# Patient Record
Sex: FEMALE | URBAN - METROPOLITAN AREA
[De-identification: names, ages, dates, MRNs, and addresses within clinical notes are randomized per-mention and may not be internally consistent; named-entity substitution may affect disease eponyms.]

---

## 2017-05-04 ENCOUNTER — TRANSFERRED RECORDS (OUTPATIENT)
Dept: HEALTH INFORMATION MANAGEMENT | Facility: CLINIC | Age: 67
End: 2017-05-04

## 2017-09-05 ENCOUNTER — TELEPHONE (OUTPATIENT)
Dept: RHEUMATOLOGY | Facility: CLINIC | Age: 67
End: 2017-09-05

## 2017-10-23 ENCOUNTER — RECORDS - HEALTHEAST (OUTPATIENT)
Dept: LAB | Facility: CLINIC | Age: 67
End: 2017-10-23

## 2017-10-23 LAB
CHOLEST SERPL-MCNC: 233 MG/DL
FASTING STATUS PATIENT QL REPORTED: NO
HDLC SERPL-MCNC: 67 MG/DL
LDLC SERPL CALC-MCNC: 145 MG/DL
TRIGL SERPL-MCNC: 104 MG/DL

## 2017-11-30 ENCOUNTER — OFFICE VISIT (OUTPATIENT)
Dept: DERMATOLOGY | Facility: CLINIC | Age: 67
End: 2017-11-30

## 2017-11-30 DIAGNOSIS — L93.0 DISCOID LUPUS ERYTHEMATOSUS: Primary | ICD-10-CM

## 2017-11-30 RX ORDER — DESONIDE 0.5 MG/G
CREAM TOPICAL
COMMUNITY
Start: 2017-06-01

## 2017-11-30 RX ORDER — TACROLIMUS 1 MG/G
OINTMENT TOPICAL 2 TIMES DAILY
Qty: 60 G | Refills: 6 | Status: SHIPPED | OUTPATIENT
Start: 2017-11-30

## 2017-11-30 ASSESSMENT — PAIN SCALES - GENERAL: PAINLEVEL: NO PAIN (0)

## 2017-11-30 NOTE — LETTER
11/30/2017       RE: Cintia Newby  [address] [place] [address]     Dear Colleague,    Thank you for referring your patient, Cintia Newby, to the [facility] DERMATOLOGY at [facility]. Please see a copy of my visit note below.    [facility] Dermatology Note    Dermatology Problem List:  1. Chronic cutaneous lupus (diagnosed 2008)   - Discoid lupus erythematosus    - Tumid lupus erythematosus   2. Positive TAMMY 1:160    Encounter Date: Nov 30, 2017    CC:  Chief Complaint   Patient presents with     Derm Problem     Cintia comes to clinic today for lesions on her face referred by rheumatology.     History of Present Illness:  Ms. Cintia Newby is a 67 year old female who presents as a new patient referred by Dr Rocio Samayoa, in Rheumatology for discoid lupus erythematosus.  In 2008 her first spots showed up  When she was living  in [place]. She notes at that time she was started on plaquanil and the lesions resolved, but recently the spots recurred. She was seen at [facility] and they re-biopsied the lesions which demonstrated similar pathology. She notes when she uses desonide ointment on the lesions, they resolve, but if she stops using the desonide on them they will recur. She notes that starting in 2017 that the lesions started looking a little different then they did in the past.  No dry eyes or mouth, no joint pain, no fatigue   No other lesions reported.     Past Medical History:   Discoid lupus erythematosus  Guillain-[place]   Hyperthyroidisim  Osteoarthritis    Social History:  The patient lived in [place] [facility] 15-20 years ago.     Family History:  The patient has a son with psoriatic arthritis.     Medications:  Current Outpatient Prescriptions   Medication Sig Dispense Refill     desonide (DESOWEN) 0.05 % cream        denosumab (PROLIA) 60 MG/ML SOLN injection        Allergies   Allergen Reactions      Macrodantin  [Nitrofurantoin Macrocrystal] Hives     Sulfamethoxazole-Trimethoprim Dermatitis     Review of Systems:  Otherwise nml state of health. No other skin concerns..    GEN: This is a well developed, well-nourished female in no acute distress, in a pleasant mood.    -Pink plaque with adherent scale with some hyperpigmentation not very well demarcated on the bridge on nose.   - Cheek: smooth pink indurated plaque without scale  -Gritty papule on the right temple   -Pink gritty plaque right temple 5x3 mm  -Nonspecific pink papule 2mm on check  -No other lesions of concern on areas examined.     Pathology   3/11/2008; Rt superior dorsum of nose  Thinning of the epidermis with hyperkeratosis. There is vacuolar change of the basal layer with thickening of the basement membrane. A superficial and deeper perivascular infiltrate of mononuclear cells is present with involvement of the preifollicular as well as perivascular areas. This correlates best with lupus erythematosus. Discoid lupus may have similar features.    5/8/17; left nasal root; rt cheek  The epidermis varies from atrophic to acanthotic with overlying hyperparakeratosis and underlying thickening of the basement membrane. There is pathcy basilar vacuolization which is sometimes associated with a lichenoid infiltrate. There is perivascular and periappendageal chronic inflammatory infiltrate present. This is consistent with a connective tissue disease (i.e. Lupus erythematosus)     Labs  + Ro60 20.7 (7/19), TAMMY neg (7/19/17), other CELE neg  + TAMMY (homogenous, 1:160; 10/19/16  C3/C4 wnl  ESR 30    Impression/Plan:  1. Chronic cutaneous lupus erythematosus    At this time does not meet criteria for systemic disease, but with increasing lesions, some possibly below the neck and + TAMMY and Ro60, will need to keep an eye on her to make sure she does not develop more symptoms    At this time she would prefer not to take systemic medications if it is not  needed and since topical medications keep the skin controlled, I think it is reasonable to just continue treating topically.    I would prefer she switch to protopic over desonide for more due to the fact it is stronger, less risk around the eyes long term and less risk of atrophy.     At this point, she can follow-up with me as needed. She should follow-up with me or rheumatology yearly.    Sun protection including regular sun screen use was advised.     Return to clinic as needed    Staff Involved:  Scribe/Staff    Scribe Disclosure:   I, Crystal Wright, am serving as a scribe to document services personally performed by Dr. Cristiano Paige, based on data collection and the provider's statements to me.       Cristiano Paige MD, FAAD    Departments of Internal Medicine and Dermatology  [facility]  [phone]

## 2017-11-30 NOTE — MR AVS SNAPSHOT
After Visit Summary   11/30/2017    Cintia Newby    MRN: [id number]           Patient Information     Date Of Birth          1950        Visit Information        Provider Department      11/30/2017 6:30 PM Cristiano Paige MD [facility] Dermatology        Today's Diagnoses     Discoid lupus erythematosus    -  1       Follow-ups after your visit        Who to contact     Please call your clinic at [phone] to:    Ask questions about your health    Make or cancel appointments    Discuss your medicines    Learn about your test results    Speak to your doctor   If you have compliments or concerns about an experience at your clinic, or if you wish to file a complaint, please contact [facility] Patient Relations at [phone] or email us at [email or web address]         Additional Information About Your Visit        MyChart Information     [employer] gives you secure access to your electronic health record. If you see a primary care provider, you can also send messages to your care team and make appointments. If you have questions, please call your primary care clinic.  If you do not have a primary care provider, please call [phone] and they will assist you.      [employer] is an electronic gateway that provides easy, online access to your medical records. With [employer], you can request a clinic appointment, read your test results, renew a prescription or communicate with your care team.     To access your existing account, please contact your [facility] or call [phone] for assistance.        Care EveryWhere ID     This is your Care EveryWhere ID. This could be used by other organizations to access your [place] medical records  [id number]         Blood Pressure from Last 3 Encounters:   No data found for BP    Weight from Last 3 Encounters:   No data found for Wt              Today, you had the following     No orders  found for display         Today's Medication Changes          These changes are accurate as of: 11/30/17 11:59 PM.  If you have any questions, ask your nurse or doctor.               Start taking these medicines.        Dose/Directions    tacrolimus 0.1 % ointment   Commonly known as:  PROTOPIC   Used for:  Discoid lupus erythematosus   Started by:  Cristiano Paige MD        Apply topically 2 times daily   Quantity:  60 g   Refills:  6            Where to get your medicines      These medications were sent to [facility]/pharmacy #[id number] [place], [place] - [address]  [address] [address]     Phone:  [phone]     tacrolimus 0.1 % ointment                Primary Care Provider    None Specified       No primary provider on file.        Equal Access to Services     [facility] : Stephon Richardson, dale brown, luzmaria hernandez, jackie polk. So [facility] [phone].    ATENCIÓN: Si habla español, tiene a chapman disposición servicios gratuitos de asistencia lingüística. Llame al [phone].    We comply with applicable federal civil rights laws and [place] laws. We do not discriminate on the basis of race, color, national origin, age, disability, sex, sexual orientation, or gender identity.            Thank you!     Thank you for choosing [facility]  for your care. Our goal is always to provide you with excellent care. Hearing back from our patients is one way we can continue to improve our services. Please take a few minutes to complete the written survey that you may receive in the mail after your visit with us. Thank you!             Your Updated Medication List - Protect others around you: Learn how to safely use, store and throw away your medicines at www.disposemymeds.org.          This list is accurate as of: 11/30/17 11:59 PM.  Always use your most recent med list.                   Brand Name Dispense Instructions for use  Diagnosis    desonide 0.05 % cream    DESOWEN          PROLIA 60 MG/ML Soln injection   Generic drug:  denosumab           tacrolimus 0.1 % ointment    PROTOPIC    60 g    Apply topically 2 times daily    Discoid lupus erythematosus

## 2017-12-01 NOTE — PROGRESS NOTES
[facility] Dermatology Note    Dermatology Problem List:  1. Chronic cutaneous lupus (diagnosed 2008)   - Discoid lupus erythematosus    - Tumid lupus erythematosus   2. Positive TAMMY 1:160    Encounter Date: Nov 30, 2017    CC:  Chief Complaint   Patient presents with     Derm Problem     Cintia comes to clinic today for lesions on her face referred by rheumatology.     History of Present Illness:  Ms. Cintia Newby is a 67 year old female who presents as a new patient referred by Dr Rocio Samayoa, in Rheumatology for discoid lupus erythematosus.  In 2008 her first spots showed up  When she was living  in [place]. She notes at that time she was started on plaquanil and the lesions resolved, but recently the spots recurred. She was seen at [facility] and they re-biopsied the lesions which demonstrated similar pathology. She notes when she uses desonide ointment on the lesions, they resolve, but if she stops using the desonide on them they will recur. She notes that starting in 2017 that the lesions started looking a little different then they did in the past.  No dry eyes or mouth, no joint pain, no fatigue   No other lesions reported.     Past Medical History:   Discoid lupus erythematosus  Guillain-[place]   Hyperthyroidisim  Osteoarthritis    Social History:  The patient lived in [facility] 15-20 years ago.     Family History:  The patient has a son with psoriatic arthritis.     Medications:  Current Outpatient Prescriptions   Medication Sig Dispense Refill     desonide (DESOWEN) 0.05 % cream        denosumab (PROLIA) 60 MG/ML SOLN injection        Allergies   Allergen Reactions     Macrodantin  [Nitrofurantoin Macrocrystal] Hives     Sulfamethoxazole-Trimethoprim Dermatitis     Review of Systems:  Otherwise nml state of health. No other skin concerns..    GEN: This is a well developed, well-nourished female in no acute distress, in a pleasant mood.    -Pink  plaque with adherent scale with some hyperpigmentation not very well demarcated on the bridge on nose.   - Cheek: smooth pink indurated plaque without scale  -Gritty papule on the right temple   -Pink gritty plaque right temple 5x3 mm  -Nonspecific pink papule 2mm on check  -No other lesions of concern on areas examined.     Pathology   3/11/2008; Rt superior dorsum of nose  Thinning of the epidermis with hyperkeratosis. There is vacuolar change of the basal layer with thickening of the basement membrane. A superficial and deeper perivascular infiltrate of mononuclear cells is present with involvement of the preifollicular as well as perivascular areas. This correlates best with lupus erythematosus. Discoid lupus may have similar features.    5/8/17; left nasal root; rt cheek  The epidermis varies from atrophic to acanthotic with overlying hyperparakeratosis and underlying thickening of the basement membrane. There is pathcy basilar vacuolization which is sometimes associated with a lichenoid infiltrate. There is perivascular and periappendageal chronic inflammatory infiltrate present. This is consistent with a connective tissue disease (i.e. Lupus erythematosus)     Labs  + Ro60 20.7 (7/19), TAMMY neg (7/19/17), other CELE neg  + TAMMY (homogenous, 1:160; 10/19/16  C3/C4 wnl  ESR 30    Impression/Plan:  1. Chronic cutaneous lupus erythematosus    At this time does not meet criteria for systemic disease, but with increasing lesions, some possibly below the neck and + TAMMY and Ro60, will need to keep an eye on her to make sure she does not develop more symptoms    At this time she would prefer not to take systemic medications if it is not needed and since topical medications keep the skin controlled, I think it is reasonable to just continue treating topically.    I would prefer she switch to protopic over desonide for more due to the fact it is stronger, less risk around the eyes long term and less risk of atrophy.     At  this point, she can follow-up with me as needed. She should follow-up with me or rheumatology yearly.    Sun protection including regular sun screen use was advised.     Return to clinic as needed    Staff Involved:  Scribe/Staff    Scribe Disclosure:   I, Crystal Wright, am serving as a scribe to document services personally performed by Dr. Cristiano Paige, based on data collection and the provider's statements to me.       Cristiano Paige MD, FAAD    Departments of Internal Medicine and Dermatology  [facility]  [phone]

## 2017-12-01 NOTE — NURSING NOTE
Dermatology Rooming Note    Cintia Newby's goals for this visit include:   Chief Complaint   Patient presents with     Derm Problem     Cintia comes to clinic today for lesions on her face referred by rheumatology.     Sivan Perry, CMA

## 2020-03-11 ENCOUNTER — HEALTH MAINTENANCE LETTER (OUTPATIENT)
Age: 70
End: 2020-03-11

## 2020-12-27 ENCOUNTER — HEALTH MAINTENANCE LETTER (OUTPATIENT)
Age: 70
End: 2020-12-27

## 2021-04-25 ENCOUNTER — HEALTH MAINTENANCE LETTER (OUTPATIENT)
Age: 71
End: 2021-04-25

## 2021-10-09 ENCOUNTER — HEALTH MAINTENANCE LETTER (OUTPATIENT)
Age: 71
End: 2021-10-09

## 2022-03-26 ENCOUNTER — HEALTH MAINTENANCE LETTER (OUTPATIENT)
Age: 72
End: 2022-03-26

## 2022-05-21 ENCOUNTER — HEALTH MAINTENANCE LETTER (OUTPATIENT)
Age: 72
End: 2022-05-21

## 2022-09-17 ENCOUNTER — HEALTH MAINTENANCE LETTER (OUTPATIENT)
Age: 72
End: 2022-09-17

## 2023-06-04 ENCOUNTER — HEALTH MAINTENANCE LETTER (OUTPATIENT)
Age: 73
End: 2023-06-04